# Patient Record
Sex: FEMALE | Race: WHITE | NOT HISPANIC OR LATINO | Employment: UNEMPLOYED | URBAN - METROPOLITAN AREA
[De-identification: names, ages, dates, MRNs, and addresses within clinical notes are randomized per-mention and may not be internally consistent; named-entity substitution may affect disease eponyms.]

---

## 2019-05-14 PROBLEM — G89.29 CHRONIC MIDLINE LOW BACK PAIN WITH LEFT-SIDED SCIATICA: Status: ACTIVE | Noted: 2019-05-14

## 2019-05-14 PROBLEM — M54.42 CHRONIC MIDLINE LOW BACK PAIN WITH LEFT-SIDED SCIATICA: Status: ACTIVE | Noted: 2019-05-14

## 2021-05-24 PROBLEM — M54.2 NECK PAIN: Status: ACTIVE | Noted: 2021-05-24

## 2022-05-19 ENCOUNTER — OFFICE VISIT (OUTPATIENT)
Dept: PRIMARY CARE CLINIC | Facility: CLINIC | Age: 52
End: 2022-05-19
Payer: MEDICAID

## 2022-05-19 VITALS
HEART RATE: 65 BPM | HEIGHT: 65 IN | SYSTOLIC BLOOD PRESSURE: 122 MMHG | DIASTOLIC BLOOD PRESSURE: 77 MMHG | BODY MASS INDEX: 21.83 KG/M2 | RESPIRATION RATE: 18 BRPM | WEIGHT: 131 LBS | OXYGEN SATURATION: 100 %

## 2022-05-19 DIAGNOSIS — Z11.59 NEED FOR HEPATITIS C SCREENING TEST: ICD-10-CM

## 2022-05-19 DIAGNOSIS — F32.A ANXIETY AND DEPRESSION: ICD-10-CM

## 2022-05-19 DIAGNOSIS — Z12.31 BREAST CANCER SCREENING BY MAMMOGRAM: ICD-10-CM

## 2022-05-19 DIAGNOSIS — Z00.00 ANNUAL PHYSICAL EXAM: ICD-10-CM

## 2022-05-19 DIAGNOSIS — F41.9 ANXIETY AND DEPRESSION: ICD-10-CM

## 2022-05-19 DIAGNOSIS — Z01.419 WELL WOMAN EXAM WITH ROUTINE GYNECOLOGICAL EXAM: Primary | ICD-10-CM

## 2022-05-19 DIAGNOSIS — Z11.4 ENCOUNTER FOR SCREENING FOR HIV: ICD-10-CM

## 2022-05-19 DIAGNOSIS — Z13.29 THYROID DISORDER SCREEN: ICD-10-CM

## 2022-05-19 DIAGNOSIS — Z01.419 WELL WOMAN EXAM WITH ROUTINE GYNECOLOGICAL EXAM: ICD-10-CM

## 2022-05-19 PROBLEM — M48.02 SPINAL STENOSIS OF CERVICAL REGION: Status: ACTIVE | Noted: 2018-07-20

## 2022-05-19 PROCEDURE — 3078F PR MOST RECENT DIASTOLIC BLOOD PRESSURE < 80 MM HG: ICD-10-PCS | Mod: CPTII,S$GLB,, | Performed by: NURSE PRACTITIONER

## 2022-05-19 PROCEDURE — 99204 OFFICE O/P NEW MOD 45 MIN: CPT | Mod: S$GLB,,, | Performed by: NURSE PRACTITIONER

## 2022-05-19 PROCEDURE — 3008F BODY MASS INDEX DOCD: CPT | Mod: CPTII,S$GLB,, | Performed by: NURSE PRACTITIONER

## 2022-05-19 PROCEDURE — 1159F PR MEDICATION LIST DOCUMENTED IN MEDICAL RECORD: ICD-10-PCS | Mod: CPTII,S$GLB,, | Performed by: NURSE PRACTITIONER

## 2022-05-19 PROCEDURE — 3074F PR MOST RECENT SYSTOLIC BLOOD PRESSURE < 130 MM HG: ICD-10-PCS | Mod: CPTII,S$GLB,, | Performed by: NURSE PRACTITIONER

## 2022-05-19 PROCEDURE — 1160F RVW MEDS BY RX/DR IN RCRD: CPT | Mod: CPTII,S$GLB,, | Performed by: NURSE PRACTITIONER

## 2022-05-19 PROCEDURE — 3008F PR BODY MASS INDEX (BMI) DOCUMENTED: ICD-10-PCS | Mod: CPTII,S$GLB,, | Performed by: NURSE PRACTITIONER

## 2022-05-19 PROCEDURE — 99204 PR OFFICE/OUTPT VISIT, NEW, LEVL IV, 45-59 MIN: ICD-10-PCS | Mod: S$GLB,,, | Performed by: NURSE PRACTITIONER

## 2022-05-19 PROCEDURE — 3078F DIAST BP <80 MM HG: CPT | Mod: CPTII,S$GLB,, | Performed by: NURSE PRACTITIONER

## 2022-05-19 PROCEDURE — 3074F SYST BP LT 130 MM HG: CPT | Mod: CPTII,S$GLB,, | Performed by: NURSE PRACTITIONER

## 2022-05-19 PROCEDURE — 1159F MED LIST DOCD IN RCRD: CPT | Mod: CPTII,S$GLB,, | Performed by: NURSE PRACTITIONER

## 2022-05-19 PROCEDURE — 1160F PR REVIEW ALL MEDS BY PRESCRIBER/CLIN PHARMACIST DOCUMENTED: ICD-10-PCS | Mod: CPTII,S$GLB,, | Performed by: NURSE PRACTITIONER

## 2022-05-19 RX ORDER — TIZANIDINE 4 MG/1
TABLET ORAL
COMMUNITY
Start: 2022-02-28 | End: 2022-05-19

## 2022-05-19 RX ORDER — GABAPENTIN 800 MG/1
800 TABLET ORAL EVERY 8 HOURS PRN
COMMUNITY
Start: 2022-05-12

## 2022-05-19 RX ORDER — HYDROCODONE BITARTRATE AND ACETAMINOPHEN 10; 325 MG/1; MG/1
1 TABLET ORAL EVERY 12 HOURS PRN
COMMUNITY
Start: 2022-05-12

## 2022-05-19 RX ORDER — DULOXETIN HYDROCHLORIDE 60 MG/1
CAPSULE, DELAYED RELEASE ORAL
COMMUNITY
Start: 2022-04-22 | End: 2022-05-19

## 2022-05-19 NOTE — PROGRESS NOTES
Subjective:       Patient ID: Delia Yeung is a 51 y.o. female.    Chief Complaint: Establish Care, Arm Pain (Numbness and tingling), and Leg Pain    HPI:Delia is a 51 y.o. female who presents to establish care with a new PCP. Previously seen by Dr. Robin in [place].    Reports history of Cervical fusion in  by a Neurosurgeon, Dr. Liu in [place]. Still with pain to her neck and lower back. Still seeing Dr. Liu, had a MRI of her neck on yesterday which she will call and schedule a F/U with Dr. Liu for discussion of MRI results and next steps. Dr. Robin was prescribing her Hydrocodone, Cymbalta and Gabapentin for pain. She wants to be prescribed Hydrocodone long term but I informed her I can not prescribe the medication.     History of Anxiety and Depression which she takes Cymbalta 60 mg for but she does not take it daily as prescribed. Denies SI/HI.    Last mammogram was done 3 years ago and she was found to have a lesion in her right breast which was not found to be cancerous. She has not followed up regarding this since then. Sister recently diagnosed with Breast cancer.    Last pap smear was over 8 years ago. Says she was told she had some fibroid tumors but she has not followed up on this. Needs referral to see a new GYN provider. Has been in menopause for 3 years.    Smokes 1/2 ppd of cigarettes and has smoked for 20 years. Denies drinking or using recreational drugs.    Refuses vaccines.    Refuses Colonoscopy and Cologuard at this time.                  Past Medical History:   Diagnosis Date    Chronic midline low back pain with left-sided sciatica 2019    Neck pain 2021       Past Surgical History:   Procedure Laterality Date     SECTION      SPINE SURGERY  12 Oct 2018    C4-5 ACDF /c stand alone device @[facility] /c Dr Liu    SPINE SURGERY      ACDF       Family History   Problem Relation Age of Onset    Parkinsonism Mother        Social History  "    Tobacco Use    Smoking status: Current Every Day Smoker    Smokeless tobacco: Never Used   Substance Use Topics    Alcohol use: Not Currently       Patient Active Problem List   Diagnosis    Chronic midline low back pain with left-sided sciatica    Neck pain    Spinal stenosis of cervical region       Immunization History   Administered Date(s) Administered    Influenza - Trivalent - PF (ADULT) 11/19/2012           Review of Systems   Constitutional: Negative for activity change, appetite change, chills, diaphoresis, fatigue and fever.   HENT: Negative for congestion, ear pain, sinus pain, tinnitus and trouble swallowing.    Eyes: Negative for pain and visual disturbance.   Respiratory: Negative for cough, chest tightness, shortness of breath and wheezing.    Cardiovascular: Negative for chest pain, palpitations and leg swelling.   Gastrointestinal: Negative for abdominal distention, abdominal pain, blood in stool, constipation, diarrhea, nausea and vomiting.   Endocrine: Negative for cold intolerance, heat intolerance, polydipsia, polyphagia and polyuria.   Genitourinary: Negative for decreased urine volume, dysuria, frequency and hematuria.   Musculoskeletal: Positive for back pain, neck pain and neck stiffness. Negative for gait problem.   Skin: Negative for color change, pallor and rash.   Neurological: Negative for dizziness, syncope, weakness, light-headedness, numbness and headaches.   Hematological: Negative for adenopathy. Does not bruise/bleed easily.   Psychiatric/Behavioral: Negative for behavioral problems, confusion, dysphoric mood and suicidal ideas. The patient is not nervous/anxious.      Objective:     Vitals:    05/19/22 0959   BP: 122/77   BP Location: Left arm   Patient Position: Sitting   BP Method: Large (Automatic)   Pulse: 65   Resp: 18   SpO2: 100%   Weight: 59.4 kg (131 lb)   Height: 5' 5" (1.651 m)       Physical Exam  Vitals and nursing note reviewed.   Constitutional:       " General: She is not in acute distress.     Appearance: Normal appearance. She is not diaphoretic.   HENT:      Head: Normocephalic and atraumatic.      Nose: Nose normal.      Mouth/Throat:      Mouth: Mucous membranes are moist.      Pharynx: Oropharynx is clear.   Eyes:      Extraocular Movements: Extraocular movements intact.      Conjunctiva/sclera: Conjunctivae normal.      Pupils: Pupils are equal, round, and reactive to light.   Cardiovascular:      Rate and Rhythm: Normal rate and regular rhythm.      Pulses: Normal pulses.      Heart sounds: Normal heart sounds.   Pulmonary:      Effort: Pulmonary effort is normal.      Breath sounds: Normal breath sounds. No stridor. No wheezing or rales.   Abdominal:      General: Bowel sounds are normal. There is no distension.      Palpations: Abdomen is soft.      Tenderness: There is no abdominal tenderness.   Musculoskeletal:         General: Tenderness (neck and low back) present. Normal range of motion.      Cervical back: Normal range of motion and neck supple.      Right lower leg: No edema.      Left lower leg: No edema.   Lymphadenopathy:      Cervical: No cervical adenopathy.   Skin:     General: Skin is warm and dry.      Capillary Refill: Capillary refill takes less than 2 seconds.   Neurological:      General: No focal deficit present.      Mental Status: She is alert and oriented to person, place, and time.   Psychiatric:         Mood and Affect: Mood and affect normal.         Behavior: Behavior normal. Behavior is cooperative.         Office Visit on 05/19/2022   Component Date Value Ref Range Status    Glucose 05/19/2022 91  65 - 99 mg/dL Final    BUN 05/19/2022 11  7 - 25 mg/dL Final    Creatinine 05/19/2022 0.64  0.50 - 1.05 mg/dL Final    eGFR if non African American 05/19/2022 103  > OR = 60 mL/min/1.73m2 Final    eGFR if African American 05/19/2022 120  > OR = 60 mL/min/1.73m2 Final    BUN/Creatinine Ratio 05/19/2022 NOT APPLICABLE  6 - 22  (calc) Final    Sodium 05/19/2022 141  135 - 146 mmol/L Final    Potassium 05/19/2022 4.5  3.5 - 5.3 mmol/L Final    Chloride 05/19/2022 105  98 - 110 mmol/L Final    CO2 05/19/2022 30  20 - 32 mmol/L Final    Calcium 05/19/2022 9.5  8.6 - 10.4 mg/dL Final    Total Protein 05/19/2022 6.9  6.1 - 8.1 g/dL Final    Albumin 05/19/2022 4.5  3.6 - 5.1 g/dL Final    Globulin, Total 05/19/2022 2.4  1.9 - 3.7 g/dL (calc) Final    Albumin/Globulin Ratio 05/19/2022 1.9  1.0 - 2.5 (calc) Final    Total Bilirubin 05/19/2022 0.5  0.2 - 1.2 mg/dL Final    Alkaline Phosphatase 05/19/2022 81  37 - 153 U/L Final    AST 05/19/2022 18  10 - 35 U/L Final    ALT 05/19/2022 11  6 - 29 U/L Final    WBC 05/19/2022 5.2  3.8 - 10.8 Thousand/uL Final    RBC 05/19/2022 4.54  3.80 - 5.10 Million/uL Final    Hemoglobin 05/19/2022 13.9  11.7 - 15.5 g/dL Final    Hematocrit 05/19/2022 41.0  35.0 - 45.0 % Final    MCV 05/19/2022 90.3  80.0 - 100.0 fL Final    MCH 05/19/2022 30.6  27.0 - 33.0 pg Final    MCHC 05/19/2022 33.9  32.0 - 36.0 g/dL Final    RDW 05/19/2022 11.6  11.0 - 15.0 % Final    Platelets 05/19/2022 124 (A) 140 - 400 Thousand/uL Final    MPV 05/19/2022 12.3  7.5 - 12.5 fL Final    Neutrophils, Abs 05/19/2022 3,037  1,500 - 7,800 cells/uL Final    Lymph # 05/19/2022 1,810  850 - 3,900 cells/uL Final    Mono # 05/19/2022 270  200 - 950 cells/uL Final    Eos # 05/19/2022 42  15 - 500 cells/uL Final    Baso # 05/19/2022 42  0 - 200 cells/uL Final    Neutrophils Relative 05/19/2022 58.4  % Final    Lymph % 05/19/2022 34.8  % Final    Mono % 05/19/2022 5.2  % Final    Eosinophil % 05/19/2022 0.8  % Final    Basophil % 05/19/2022 0.8  % Final    TSH 05/19/2022 0.52  mIU/L Final    Cholesterol 05/19/2022 196  <200 mg/dL Final    HDL 05/19/2022 67  > OR = 50 mg/dL Final    Triglycerides 05/19/2022 73  <150 mg/dL Final    LDL Cholesterol 05/19/2022 113 (A) mg/dL (calc) Final    HDL/Cholesterol Ratio  05/19/2022 2.9  <5.0 (calc) Final    Non HDL Chol. (LDL+VLDL) 05/19/2022 129  <130 mg/dL (calc) Final    HIV Ag/Ab 4th Gen 05/19/2022 NON-REACTIVE  NON-REACTIVE Final    Hepatitis C Ab 05/19/2022 NON-REACTIVE  NON-REACTIVE Final    Signal/Cutoff 05/19/2022 0.99  <1.00 Final         Assessment:      1. Annual physical exam    2. Encounter for screening for HIV    3. Need for hepatitis C screening test    4. Breast cancer screening by mammogram    5. Well woman exam with routine gynecological exam    6. Thyroid disorder screen    7. Anxiety and depression          Plan:     Annual physical exam  Comments:  Will review labs and determine POC based on results  Orders:  -     Comprehensive Metabolic Panel; Future; Expected date: 05/19/2022  -     CBC auto differential; Future; Expected date: 05/19/2022  -     TSH; Future; Expected date: 05/19/2022  -     Lipid Panel; Future; Expected date: 05/19/2022  -     TSH w/reflex to FT4; Future; Expected date: 05/19/2022    Encounter for screening for HIV  -     HIV 1/2 Ag/Ab (4th Gen); Future; Expected date: 05/19/2022    Need for hepatitis C screening test  -     Hepatitis C Antibody; Future; Expected date: 05/19/2022    Breast cancer screening by mammogram  -     Mammo Digital Screening Bilat; Future; Expected date: 05/19/2022    Well woman exam with routine gynecological exam  -     Cancel: Ambulatory referral/consult to Obstetrics / Gynecology; Future; Expected date: 05/26/2022    Thyroid disorder screen  -     TSH w/reflex to FT4; Future; Expected date: 05/19/2022    Anxiety and depression    Other orders  -     HIV 1/2 Ag/Ab (4th Gen)  -     Hepatitis C Antibody         Current Outpatient Medications   Medication Sig Dispense Refill    HYDROcodone-acetaminophen (NORCO)  mg per tablet Take 1 tablet by mouth every 12 (twelve) hours as needed.      gabapentin (NEURONTIN) 800 MG tablet Take 800 mg by mouth every 8 (eight) hours as needed.       No current  facility-administered medications for this visit.       Medications Discontinued During This Encounter   Medication Reason    traMADol (ULTRAM) 50 mg tablet Patient no longer taking    LYRICA 75 mg capsule Patient no longer taking    oxyCODONE-acetaminophen (PERCOCET) 5-325 mg per tablet Patient no longer taking    tiZANidine (ZANAFLEX) 4 MG tablet Patient no longer taking    DULoxetine (CYMBALTA) 60 MG capsule Patient no longer taking       Health Maintenance   Topic Date Due    TETANUS VACCINE  Never done    Mammogram  Never done    Lipid Panel  05/19/2027    Hepatitis C Screening  Completed       Patient Instructions   RTC in 1 month for F/U or sooner if needed.    Keep appts with specialists as scheduled.    Instructed patient to report to nearest ER or call 911 if begins to have difficulty breathing, turning blue, chest pain, B/P < 80/60 or >170/100, palpitations, syncope, extreme weakness, or severe H/A. Patient verbalized understanding.      Patient Education       Yearly Physical for Adults   About this topic   Most people do not want to be sick. Having a checkup each year with your doctor is one way to help you stay healthy. You may need to see your doctor more or less often. How often you need to go to the doctor depends on your age. Your family and medical history also play a role in how often you need to go to the doctor. Going to see your doctor on a routine basis can help you find problems early or even before they start. This may make it easier to treat or cure your problem.  General   Your doctor will talk about many things during your checkup. Your doctor may ask about:  · Your medical and family history.  · All the drugs you are taking. Be sure to include all prescription, over the counter, and herbal supplements. Tell the doctor if you have any drug allergy. Bring a list of drugs you take with you.  · How you are feeling and if you are having any problems.  · Risky behaviors like smoking,  drinking alcohol, using illegal drugs, not wearing seatbelts, having unprotected sex, etc.  Your doctor will do a physical exam and may check your:  · Height and weight  · Blood pressure  · Reflexes  · Memory  · Vision  · Hearing  Your doctor may order:  · Lab tests  · ECG to check your heart rhythm  · X-rays  · Tests or treatments based on your exam  What lifestyle changes are needed?   Your doctor may suggest you make changes to your lifestyle at this visit. The doctor may talk with you about being more active or lowering stress levels. Ask your doctor what you need to do.  What drugs may be needed?   Your doctor may order drugs or vaccines to protect you from illnesses.  What changes to diet are needed?   Talk to your doctor to see if any changes are needed to your diet.  When do I need to call the doctor?   Call your doctor if you need to learn about any test results. Together you can make a plan for more care.  Helpful tips   · Make a list of questions for your doctor before you go. This will help you remember to ask about any concerns. Write down any answers from your doctor so you can look over them after your visit.   · Tell your doctor about any changes in your body or health since your last visit.  · Ask your doctor about any screening tests you need.  Where can I learn more?   American Academy [employer]icians  http://familydoctor.org/familydoctor/en/prevention-wellness/staying-healthy/healthy-living/preventive-services-for-healthy-living.printerview.html   Centers for Disease Control  http://www.cdc.gov/family/checkup/   Last Reviewed Date   2019-04-22  Consumer Information Use and Disclaimer   This information is not specific medical advice and does not replace information you receive from your health care provider. This is only a brief summary of general information. It does NOT include all information about conditions, illnesses, injuries, tests, procedures, treatments, therapies, discharge  instructions or life-style choices that may apply to you. You must talk with your health care provider for complete information about your health and treatment options. This information should not be used to decide whether or not to accept your health care providers advice, instructions or recommendations. Only your health care provider has the knowledge and training to provide advice that is right for you.  Copyright   Copyright © 2021 [employer] and its affiliates and/or licensors. All rights reserved.  Patient Education       Smoking: Not Just Harmful to Your Lungs and Heart   About this topic   Smoking is very common at any age. It is one of the leading causes of poor health and illness. Smoking can lead to death. It has many harmful chemicals that are released by the tobacco you smoke and inhale. Tobacco has many forms. These are:  · Cigarettes  · Pipes  · Cigars  · Chewing tobacco  · Electronic cigarettes  · Loose  · Hookah  All kinds of tobaccos contain many toxic substances. These are what make you sick from smoking.  · Nicotine ? The main thing that makes you addicted to smoking  · Carbon monoxide ? A poison that gets into your blood when smoking  · Tar ? Has chemicals that are cancerous  · Lead and many others  These factors affect how much damage smoking will have on you:  · How much you smoke  · What you smoke  · How what you smoke has been prepared  · The content of what you smoke  Smoking hurts every part of the body. The lungs and the heart are most often affected by tobacco use.  General   Smoking is very harmful to your body. It can cause many illnesses and can affect how you look.  Smoking and Cancer   Smoking is the most common cause of lung cancer. Smoking may also increase the risk of:  · Mouth cancer. This can also be caused by chewing tobacco.  · Bladder cancer  · Cancer of the tube from the mouth to stomach. This is also called the esophagus.  · Cancer of the throat. This can also be  caused by chewing tobacco.  · Kidney cancer  · Liver cancer  · Stomach, colon, and rectal cancer  · Cancer of the pancreas  · Cancer of the cervix in women  · Cancer of the blood or leukemia  · Skin cancer  Smoking and Your Lungs   If you smoke you are more likely to have:  · Breathing problems  · Lung infections like pneumonia or bronchitis  · Lung disease such as COPD or emphysema  · Asthma  Smoking and the Heart and Circulation   · Causes heart disease and stroke  · Smokers are at a higher risk for high blood pressure  · You are more likely to get blood clots  · Smoking can lower blood flow to the legs and skin  Smoking and Diabetes   If you smoke, you:  · Have a higher risk of developing diabetes  · May have higher blood sugar levels  · May have more problems with your diabetes  Smoking and Digestion   · Smokers make more stomach acid. This can cause sores or ulcers in your belly or bowel.  · Your stomach acids may flow back to your esophagus. This is also called heartburn.  · You have more chance of bowel irritation and swelling  Smoking and Your Bones   If you smoke:  · Your bone-forming cells don't grow as fast  · Your bones may become weaker (osteoporosis)  · You may have more low back pain and arthritis  · You may have more overuse injuries  · You may have a greater risk of breaking bones  · Your broken bones may take longer to heal  Smoking and Pregnancy   · Makes your baby more prone to problems  · You have a higher chance of having a premature baby or baby born early  · Your healthy baby may die without reason. This is called sudden infant death syndrome.  · Your baby may be born dead (stillbirth)  · Your baby may have a low birth weight  · You have a higher risk of an ectopic pregnancy or a pregnancy outside of the uterus  · You have a higher chance of having a baby with mouth or facial defects  Smoking and Your Eyes, Hair, Hands, and Mouth   If you smoke, you may notice your:  · Eyes become cloudy and  form cataracts  · Hair may get thin and turn gray sooner than it should  · Fingernails turn yellow  · Teeth become yellowish brown  · Gums have more problems  · Teeth have problems or even become loose  · Sense of taste and smell start to go away  · Breath smells bad  Smoking and Skin   Smoking causes:  · Less blood flow to the skin  · Wrinkles start early around your eyes and mouth  · Dry skin  · Your skin to lose elasticity and strength  · Skin may get splotchy or pale  · Your face to look thin and old. This is called smoker's face.  · Greater risk of getting a skin problem called psoriasis  · Slower healing of cuts or bruises  Smoking and Sex Life   If you smoke you are more likely to have problems like:  · Impotence  · Decreased blood flow to the penis. This is also called erectile dysfunction.  · Trouble getting pregnant  · Early change of life or menopause for women  · A higher risk of heart attack or stroke for women who smoke and use birth control pills  · Damaged sperm that may lead to problems getting a woman pregnant, birth defects, or miscarriage  Smoking and Your Brain and Behavior   Smoking can:  · Affect your mood  · Lead to addiction  · Cause long-term confusion or damage to your brain cells  · Cause low mood  Smoking and Children   If you smoke, your children:  · Will be more likely to smoke.  · Can be sick from being around your smoke. This is called secondhand smoke.  · Need to learn about the bad effects of smoking. Most smokers start before the age of 18. Encourage your children never to smoke.  Smoking and Other Effects   Smoking can cause:  · Wounds to take longer to heal  · You to eat poorly  · Weight loss  · Swelling in the body and affect the body's immune or defense system  · Rheumatoid arthritis  · Greater chance of injury  · You to get warts easier (human papillomavirus)  · A bad odor in hair and on clothes  · You to have poor sports performance  · You to spend a lot of money. Smoking is  an expensive habit. A smoker who smokes a pack per day in the [place] will spend over $2000 per year on cigarettes.  · Health care to cost more  · You to miss work more often  · Hearing loss  Even if you have smoked for many years, it is not too late to quit. Your body starts to heal as soon as you stop smoking. It is better to quit when you are young, but you can still get healthier if you quit at any age.       Helpful tips   Some helpful steps you can take to help you quit smoking:  · Set a date to quit smoking.  · Know the reasons that make you smoke more.  · Know why you want to quit smoking.  · Write down each time you smoke. Include the time and what you are doing. Plan ahead about what you will do instead of smoking when that time or event reappears.  · Tell your family and friends about your plan to quit smoking. Let them know how to help you.  · Slowly reduce your smoking.  · Remove smoking and tobacco products from your home and other places.  · Avoid places and situations where you will more likely smoke. If people close to you smoke, ask them to quit with you. If they do not quit, ask them to not smoke around you.  · Reward or treat yourself every time you do not smoke. Do not use food as a reward.  · Ask a doctor for help.  · Talk with your doctor before you try an electronic cigarette.  Where can I learn more?   Centers for Disease Control and Prevention  http://www.cdc.gov/tobacco/data_statistics/fact_sheets/health_effects/effects_cig_smoking/#overview   Centers for Disease Control and Prevention  https://www.cdc.gov/tobacco/campaign/tips/quit-smoking/guide/quit-plan.html?s_cid=OSH_tips_D9400   KidsHealth  http://kidshealth.org/teen/drug_alcohol/tobacco/smoking.html#   US Food and Drug Administration  https://www.fda.gov/TobaccoProducts/Labeling/ProductsIngredientsComponents/default.htm   Last Reviewed Date   2021-03-31  Consumer Information Use and Disclaimer   This information is not specific medical  advice and does not replace information you receive from your health care provider. This is only a brief summary of general information. It does NOT include all information about conditions, illnesses, injuries, tests, procedures, treatments, therapies, discharge instructions or life-style choices that may apply to you. You must talk with your health care provider for complete information about your health and treatment options. This information should not be used to decide whether or not to accept your health care providers advice, instructions or recommendations. Only your health care provider has the knowledge and training to provide advice that is right for you.  Copyright   Copyright © 2021 [employer], [employer]. and its affiliates and/or licensors. All rights reserved.                Risks, benefits, and alternatives discussed with patient, Patient verbalized understanding of discussed plan of care. Asked patient if any further questions, answered no.    Future Appointments   Date Time Provider Department [place]   6/2/2022 10:00 AM John Reyes MD [id number]   6/20/2022  9:40 AM Mi Barnes NP [facility] Barnes NP

## 2022-05-19 NOTE — PATIENT INSTRUCTIONS
RTC in 1 month for F/U or sooner if needed.    Keep appts with specialists as scheduled.    Instructed patient to report to nearest ER or call 911 if begins to have difficulty breathing, turning blue, chest pain, B/P < 80/60 or >170/100, palpitations, syncope, extreme weakness, or severe H/A. Patient verbalized understanding.      Patient Education       Yearly Physical for Adults   About this topic   Most people do not want to be sick. Having a checkup each year with your doctor is one way to help you stay healthy. You may need to see your doctor more or less often. How often you need to go to the doctor depends on your age. Your family and medical history also play a role in how often you need to go to the doctor. Going to see your doctor on a routine basis can help you find problems early or even before they start. This may make it easier to treat or cure your problem.  General   Your doctor will talk about many things during your checkup. Your doctor may ask about:  Your medical and family history.  All the drugs you are taking. Be sure to include all prescription, over the counter, and herbal supplements. Tell the doctor if you have any drug allergy. Bring a list of drugs you take with you.  How you are feeling and if you are having any problems.  Risky behaviors like smoking, drinking alcohol, using illegal drugs, not wearing seatbelts, having unprotected sex, etc.  Your doctor will do a physical exam and may check your:  Height and weight  Blood pressure  Reflexes  Memory  Vision  Hearing  Your doctor may order:  Lab tests  ECG to check your heart rhythm  X-rays  Tests or treatments based on your exam  What lifestyle changes are needed?   Your doctor may suggest you make changes to your lifestyle at this visit. The doctor may talk with you about being more active or lowering stress levels. Ask your doctor what you need to do.  What drugs may be needed?   Your doctor may order drugs or vaccines to protect you  from illnesses.  What changes to diet are needed?   Talk to your doctor to see if any changes are needed to your diet.  When do I need to call the doctor?   Call your doctor if you need to learn about any test results. Together you can make a plan for more care.  Helpful tips   Make a list of questions for your doctor before you go. This will help you remember to ask about any concerns. Write down any answers from your doctor so you can look over them after your visit.   Tell your doctor about any changes in your body or health since your last visit.  Ask your doctor about any screening tests you need.  Where can I learn more?   American Academy [employer]  http://familydoctor.org/familydoctor/en/prevention-wellness/staying-healthy/healthy-living/preventive-services-for-healthy-living.printerview.html   Centers for Disease Control  http://www.cdc.gov/family/checkup/   Last Reviewed Date   2019-04-22  Consumer Information Use and Disclaimer   This information is not specific medical advice and does not replace information you receive from your health care provider. This is only a brief summary of general information. It does NOT include all information about conditions, illnesses, injuries, tests, procedures, treatments, therapies, discharge instructions or life-style choices that may apply to you. You must talk with your health care provider for complete information about your health and treatment options. This information should not be used to decide whether or not to accept your health care providers advice, instructions or recommendations. Only your health care provider has the knowledge and training to provide advice that is right for you.  Copyright   Copyright © 2021 [employer] and its affiliates and/or licensors. All rights reserved.  Patient Education       Smoking: Not Just Harmful to Your Lungs and Heart   About this topic   Smoking is very common at any age. It is one of the leading causes  of poor health and illness. Smoking can lead to death. It has many harmful chemicals that are released by the tobacco you smoke and inhale. Tobacco has many forms. These are:  Cigarettes  Pipes  Cigars  Chewing tobacco  Electronic cigarettes  Loose  Hookah  All kinds of tobaccos contain many toxic substances. These are what make you sick from smoking.  Nicotine ? The main thing that makes you addicted to smoking  Carbon monoxide ? A poison that gets into your blood when smoking  Tar ? Has chemicals that are cancerous  Lead and many others  These factors affect how much damage smoking will have on you:  How much you smoke  What you smoke  How what you smoke has been prepared  The content of what you smoke  Smoking hurts every part of the body. The lungs and the heart are most often affected by tobacco use.  General   Smoking is very harmful to your body. It can cause many illnesses and can affect how you look.  Smoking and Cancer   Smoking is the most common cause of lung cancer. Smoking may also increase the risk of:  Mouth cancer. This can also be caused by chewing tobacco.  Bladder cancer  Cancer of the tube from the mouth to stomach. This is also called the esophagus.  Cancer of the throat. This can also be caused by chewing tobacco.  Kidney cancer  Liver cancer  Stomach, colon, and rectal cancer  Cancer of the pancreas  Cancer of the cervix in women  Cancer of the blood or leukemia  Skin cancer  Smoking and Your Lungs   If you smoke you are more likely to have:  Breathing problems  Lung infections like pneumonia or bronchitis  Lung disease such as COPD or emphysema  Asthma  Smoking and the Heart and Circulation   Causes heart disease and stroke  Smokers are at a higher risk for high blood pressure  You are more likely to get blood clots  Smoking can lower blood flow to the legs and skin  Smoking and Diabetes   If you smoke, you:  Have a higher risk of developing diabetes  May have higher blood sugar levels  May  have more problems with your diabetes  Smoking and Digestion   Smokers make more stomach acid. This can cause sores or ulcers in your belly or bowel.  Your stomach acids may flow back to your esophagus. This is also called heartburn.  You have more chance of bowel irritation and swelling  Smoking and Your Bones   If you smoke:  Your bone-forming cells don't grow as fast  Your bones may become weaker (osteoporosis)  You may have more low back pain and arthritis  You may have more overuse injuries  You may have a greater risk of breaking bones  Your broken bones may take longer to heal  Smoking and Pregnancy   Makes your baby more prone to problems  You have a higher chance of having a premature baby or baby born early  Your healthy baby may die without reason. This is called sudden infant death syndrome.  Your baby may be born dead (stillbirth)  Your baby may have a low birth weight  You have a higher risk of an ectopic pregnancy or a pregnancy outside of the uterus  You have a higher chance of having a baby with mouth or facial defects  Smoking and Your Eyes, Hair, Hands, and Mouth   If you smoke, you may notice your:  Eyes become cloudy and form cataracts  Hair may get thin and turn gray sooner than it should  Fingernails turn yellow  Teeth become yellowish brown  Gums have more problems  Teeth have problems or even become loose  Sense of taste and smell start to go away  Breath smells bad  Smoking and Skin   Smoking causes:  Less blood flow to the skin  Wrinkles start early around your eyes and mouth  Dry skin  Your skin to lose elasticity and strength  Skin may get splotchy or pale  Your face to look thin and old. This is called smoker's face.  Greater risk of getting a skin problem called psoriasis  Slower healing of cuts or bruises  Smoking and Sex Life   If you smoke you are more likely to have problems like:  Impotence  Decreased blood flow to the penis. This is also called erectile dysfunction.  Trouble  getting pregnant  Early change of life or menopause for women  A higher risk of heart attack or stroke for women who smoke and use birth control pills  Damaged sperm that may lead to problems getting a woman pregnant, birth defects, or miscarriage  Smoking and Your Brain and Behavior   Smoking can:  Affect your mood  Lead to addiction  Cause long-term confusion or damage to your brain cells  Cause low mood  Smoking and Children   If you smoke, your children:  Will be more likely to smoke.  Can be sick from being around your smoke. This is called secondhand smoke.  Need to learn about the bad effects of smoking. Most smokers start before the age of 18. Encourage your children never to smoke.  Smoking and Other Effects   Smoking can cause:  Wounds to take longer to heal  You to eat poorly  Weight loss  Swelling in the body and affect the body's immune or defense system  Rheumatoid arthritis  Greater chance of injury  You to get warts easier (human papillomavirus)  A bad odor in hair and on clothes  You to have poor sports performance  You to spend a lot of money. Smoking is an expensive habit. A smoker who smokes a pack per day in the [place] will spend over $2000 per year on cigarettes.  Health care to cost more  You to miss work more often  Hearing loss  Even if you have smoked for many years, it is not too late to quit. Your body starts to heal as soon as you stop smoking. It is better to quit when you are young, but you can still get healthier if you quit at any age.       Helpful tips   Some helpful steps you can take to help you quit smoking:  Set a date to quit smoking.  Know the reasons that make you smoke more.  Know why you want to quit smoking.  Write down each time you smoke. Include the time and what you are doing. Plan ahead about what you will do instead of smoking when that time or event reappears.  Tell your family and friends about your plan to quit smoking. Let them know how to help you.  Slowly reduce  your smoking.  Remove smoking and tobacco products from your home and other places.  Avoid places and situations where you will more likely smoke. If people close to you smoke, ask them to quit with you. If they do not quit, ask them to not smoke around you.  Reward or treat yourself every time you do not smoke. Do not use food as a reward.  Ask a doctor for help.  Talk with your doctor before you try an electronic cigarette.  Where can I learn more?   Centers for Disease Control and Prevention  http://www.cdc.gov/tobacco/data_statistics/fact_sheets/health_effects/effects_cig_smoking/#overview   Centers for Disease Control and Prevention  https://www.cdc.gov/tobacco/campaign/tips/quit-smoking/guide/quit-plan.html?s_cid=OSH_tips_D9400   [facility]  http://[employer].org/teen/drug_alcohol/tobacco/smoking.html#   [place] Food and Drug Administration  https://www.fda.gov/TobaccoProducts/Labeling/ProductsIngredientsComponents/default.htm   Last Reviewed Date   2021-03-31  Consumer Information Use and Disclaimer   This information is not specific medical advice and does not replace information you receive from your health care provider. This is only a brief summary of general information. It does NOT include all information about conditions, illnesses, injuries, tests, procedures, treatments, therapies, discharge instructions or life-style choices that may apply to you. You must talk with your health care provider for complete information about your health and treatment options. This information should not be used to decide whether or not to accept your health care providers advice, instructions or recommendations. Only your health care provider has the knowledge and training to provide advice that is right for you.  Copyright   Copyright © 2021 [employer] and its affiliates and/or licensors. All rights reserved.

## 2022-05-21 LAB
ALBUMIN SERPL-MCNC: 4.5 G/DL (ref 3.6–5.1)
ALBUMIN/GLOB SERPL: 1.9 (CALC) (ref 1–2.5)
ALP SERPL-CCNC: 81 U/L (ref 37–153)
ALT SERPL-CCNC: 11 U/L (ref 6–29)
AST SERPL-CCNC: 18 U/L (ref 10–35)
BASOPHILS # BLD AUTO: 42 CELLS/UL (ref 0–200)
BASOPHILS NFR BLD AUTO: 0.8 %
BILIRUB SERPL-MCNC: 0.5 MG/DL (ref 0.2–1.2)
BUN SERPL-MCNC: 11 MG/DL (ref 7–25)
BUN/CREAT SERPL: NORMAL (CALC) (ref 6–22)
CALCIUM SERPL-MCNC: 9.5 MG/DL (ref 8.6–10.4)
CHLORIDE SERPL-SCNC: 105 MMOL/L (ref 98–110)
CHOLEST SERPL-MCNC: 196 MG/DL
CHOLEST/HDLC SERPL: 2.9 (CALC)
CO2 SERPL-SCNC: 30 MMOL/L (ref 20–32)
CREAT SERPL-MCNC: 0.64 MG/DL (ref 0.5–1.05)
EOSINOPHIL # BLD AUTO: 42 CELLS/UL (ref 15–500)
EOSINOPHIL NFR BLD AUTO: 0.8 %
ERYTHROCYTE [DISTWIDTH] IN BLOOD BY AUTOMATED COUNT: 11.6 % (ref 11–15)
GLOBULIN SER CALC-MCNC: 2.4 G/DL (CALC) (ref 1.9–3.7)
GLUCOSE SERPL-MCNC: 91 MG/DL (ref 65–99)
HCT VFR BLD AUTO: 41 % (ref 35–45)
HCV AB S/CO SERPL IA: 0.99
HCV AB SERPL QL IA: NORMAL
HDLC SERPL-MCNC: 67 MG/DL
HGB BLD-MCNC: 13.9 G/DL (ref 11.7–15.5)
HIV 1+2 AB+HIV1 P24 AG SERPL QL IA: NORMAL
LDLC SERPL CALC-MCNC: 113 MG/DL (CALC)
LYMPHOCYTES # BLD AUTO: 1810 CELLS/UL (ref 850–3900)
LYMPHOCYTES NFR BLD AUTO: 34.8 %
MCH RBC QN AUTO: 30.6 PG (ref 27–33)
MCHC RBC AUTO-ENTMCNC: 33.9 G/DL (ref 32–36)
MCV RBC AUTO: 90.3 FL (ref 80–100)
MONOCYTES # BLD AUTO: 270 CELLS/UL (ref 200–950)
MONOCYTES NFR BLD AUTO: 5.2 %
NEUTROPHILS # BLD AUTO: 3037 CELLS/UL (ref 1500–7800)
NEUTROPHILS NFR BLD AUTO: 58.4 %
NONHDLC SERPL-MCNC: 129 MG/DL (CALC)
PLATELET # BLD AUTO: 124 THOUSAND/UL (ref 140–400)
PMV BLD REES-ECKER: 12.3 FL (ref 7.5–12.5)
POTASSIUM SERPL-SCNC: 4.5 MMOL/L (ref 3.5–5.3)
PROT SERPL-MCNC: 6.9 G/DL (ref 6.1–8.1)
RBC # BLD AUTO: 4.54 MILLION/UL (ref 3.8–5.1)
SODIUM SERPL-SCNC: 141 MMOL/L (ref 135–146)
TRIGL SERPL-MCNC: 73 MG/DL
TSH SERPL-ACNC: 0.52 MIU/L
WBC # BLD AUTO: 5.2 THOUSAND/UL (ref 3.8–10.8)

## 2022-05-26 NOTE — PROGRESS NOTES
Please notify patient all of her labs are within acceptable range. We will discuss her lab results in detail at her next visit.

## 2022-05-27 ENCOUNTER — TELEPHONE (OUTPATIENT)
Dept: PRIMARY CARE CLINIC | Facility: CLINIC | Age: 52
End: 2022-05-27
Payer: MEDICAID

## 2022-05-27 NOTE — TELEPHONE ENCOUNTER
----- Message from Mi Barnes NP sent at 5/26/2022  6:58 PM CDT -----  Please notify patient all of her labs are within acceptable range. We will discuss her lab results in detail at her next visit.

## 2022-06-01 ENCOUNTER — TELEPHONE (OUTPATIENT)
Dept: PRIMARY CARE CLINIC | Facility: CLINIC | Age: 52
End: 2022-06-01
Payer: MEDICAID

## 2022-06-08 LAB — BCS RECOMMENDATION EXT: NORMAL

## 2022-06-15 ENCOUNTER — PATIENT OUTREACH (OUTPATIENT)
Dept: ADMINISTRATIVE | Facility: HOSPITAL | Age: 52
End: 2022-06-15
Payer: MEDICAID

## 2022-06-15 ENCOUNTER — TELEPHONE (OUTPATIENT)
Dept: PRIMARY CARE CLINIC | Facility: CLINIC | Age: 52
End: 2022-06-15
Payer: MEDICAID

## 2022-06-15 NOTE — TELEPHONE ENCOUNTER
----- Message from Mi Barnes NP sent at 6/15/2022  2:32 PM CDT -----  Please notify patient her Mammogram shows no abnormal masses, repeat in 1 year for routine breast cancer screening.     Sincerely,    REBEL AlegriaC

## 2022-12-01 PROBLEM — G89.29 CHRONIC LEFT-SIDED LOW BACK PAIN WITHOUT SCIATICA: Status: ACTIVE | Noted: 2022-12-01

## 2022-12-01 PROBLEM — M54.50 CHRONIC LEFT-SIDED LOW BACK PAIN WITHOUT SCIATICA: Status: ACTIVE | Noted: 2022-12-01

## 2022-12-01 PROBLEM — M43.22 CERVICAL VERTEBRAL FUSION: Status: ACTIVE | Noted: 2022-12-01

## 2023-04-20 DIAGNOSIS — Z12.11 SCREENING FOR COLON CANCER: Primary | ICD-10-CM

## 2023-04-24 ENCOUNTER — TELEPHONE (OUTPATIENT)
Dept: GASTROENTEROLOGY | Facility: CLINIC | Age: 53
End: 2023-04-24
Payer: MEDICAID

## 2023-12-11 ENCOUNTER — HOSPITAL ENCOUNTER (OUTPATIENT)
Dept: RADIOLOGY | Facility: HOSPITAL | Age: 53
Discharge: HOME | End: 2023-12-11
Attending: NURSE PRACTITIONER
Payer: MEDICAID

## 2023-12-11 DIAGNOSIS — M25.552 LEFT HIP PAIN: ICD-10-CM

## 2023-12-11 PROCEDURE — 73502 X-RAY EXAM HIP UNI 2-3 VIEWS: CPT | Mod: TC,LT
